# Patient Record
Sex: FEMALE | Race: WHITE
[De-identification: names, ages, dates, MRNs, and addresses within clinical notes are randomized per-mention and may not be internally consistent; named-entity substitution may affect disease eponyms.]

---

## 2020-04-24 ENCOUNTER — HOSPITAL ENCOUNTER (INPATIENT)
Dept: HOSPITAL 19 - LDRO | Age: 30
LOS: 1 days | Discharge: HOME | DRG: 998 | End: 2020-04-25
Attending: OBSTETRICS & GYNECOLOGY | Admitting: OBSTETRICS & GYNECOLOGY
Payer: COMMERCIAL

## 2020-04-24 VITALS — TEMPERATURE: 97.6 F | DIASTOLIC BLOOD PRESSURE: 72 MMHG | SYSTOLIC BLOOD PRESSURE: 118 MMHG | HEART RATE: 77 BPM

## 2020-04-24 VITALS — SYSTOLIC BLOOD PRESSURE: 124 MMHG | DIASTOLIC BLOOD PRESSURE: 78 MMHG | HEART RATE: 86 BPM

## 2020-04-24 VITALS — HEART RATE: 80 BPM | SYSTOLIC BLOOD PRESSURE: 120 MMHG | DIASTOLIC BLOOD PRESSURE: 74 MMHG

## 2020-04-24 VITALS — HEART RATE: 76 BPM | SYSTOLIC BLOOD PRESSURE: 114 MMHG | DIASTOLIC BLOOD PRESSURE: 73 MMHG

## 2020-04-24 VITALS — SYSTOLIC BLOOD PRESSURE: 108 MMHG | DIASTOLIC BLOOD PRESSURE: 62 MMHG | TEMPERATURE: 98.2 F | HEART RATE: 76 BPM

## 2020-04-24 VITALS — DIASTOLIC BLOOD PRESSURE: 75 MMHG | SYSTOLIC BLOOD PRESSURE: 112 MMHG | HEART RATE: 82 BPM

## 2020-04-24 VITALS — HEART RATE: 76 BPM | SYSTOLIC BLOOD PRESSURE: 108 MMHG | DIASTOLIC BLOOD PRESSURE: 72 MMHG

## 2020-04-24 VITALS — HEIGHT: 67.01 IN | WEIGHT: 180.34 LBS | BODY MASS INDEX: 28.3 KG/M2

## 2020-04-24 VITALS — SYSTOLIC BLOOD PRESSURE: 120 MMHG | HEART RATE: 88 BPM | DIASTOLIC BLOOD PRESSURE: 59 MMHG

## 2020-04-24 VITALS — HEART RATE: 80 BPM | DIASTOLIC BLOOD PRESSURE: 79 MMHG | TEMPERATURE: 98 F | SYSTOLIC BLOOD PRESSURE: 120 MMHG

## 2020-04-24 VITALS — TEMPERATURE: 98.2 F | HEART RATE: 86 BPM | SYSTOLIC BLOOD PRESSURE: 100 MMHG | DIASTOLIC BLOOD PRESSURE: 58 MMHG

## 2020-04-24 VITALS — HEART RATE: 99 BPM | DIASTOLIC BLOOD PRESSURE: 66 MMHG | SYSTOLIC BLOOD PRESSURE: 126 MMHG

## 2020-04-24 DIAGNOSIS — Z3A.40: ICD-10-CM

## 2020-04-24 DIAGNOSIS — D69.6: ICD-10-CM

## 2020-04-24 LAB
BASOPHILS # BLD: 0.1 10*3/UL (ref 0–0.2)
BASOPHILS NFR BLD AUTO: 0.7 % (ref 0–2)
EOSINOPHIL # BLD: 0.1 10*3/UL (ref 0–0.7)
EOSINOPHIL NFR BLD: 0.9 % (ref 0–4)
ERYTHROCYTE [DISTWIDTH] IN BLOOD BY AUTOMATED COUNT: 12.6 % (ref 11.5–14.5)
GRANULOCYTES # BLD AUTO: 60.8 % (ref 42.2–75.2)
HCT VFR BLD AUTO: 38.7 % (ref 37–47)
HGB BLD-MCNC: 13.5 G/DL (ref 12.5–16)
LYMPHOCYTES # BLD: 3.4 10*3/UL (ref 1.2–3.4)
LYMPHOCYTES NFR BLD: 29.5 % (ref 20–51)
MCH RBC QN AUTO: 31 PG (ref 27–31)
MCHC RBC AUTO-ENTMCNC: 35 G/DL (ref 33–37)
MCV RBC AUTO: 90 FL (ref 80–100)
MONOCYTES # BLD: 0.9 10*3/UL (ref 0.1–0.6)
MONOCYTES NFR BLD AUTO: 7.6 % (ref 1.7–9.3)
NEUTROPHILS # BLD: 7 10*3/UL (ref 1.4–6.5)
PLATELET # BLD AUTO: 118 K/MM3 (ref 130–400)
PMV BLD AUTO: 11 FL (ref 7.4–10.4)
RBC # BLD AUTO: 4.31 M/MM3 (ref 4.1–5.3)

## 2020-04-24 PROCEDURE — 0UQMXZZ REPAIR VULVA, EXTERNAL APPROACH: ICD-10-PCS | Performed by: OBSTETRICS & GYNECOLOGY

## 2020-04-24 PROCEDURE — 0HQ9XZZ REPAIR PERINEUM SKIN, EXTERNAL APPROACH: ICD-10-PCS | Performed by: OBSTETRICS & GYNECOLOGY

## 2020-04-24 NOTE — NUR
0245- PT PRESENTS TO LDR COMPLAINING OF CONTRACTIONS AND POSSIBLE LEAKING,
AMBULATORY TO ROOM LR4, CHANGED INTO GOWN.
0253- EFM X2 APPLIED. PT HAVING PINK TINGED LEAKING, HAS BEEN VARSHA FOR
LAST 1.5 HOURS, AND IS FEELING THE BABY MOVE. VSS.
0300- AMNITRACE IS NEGATIVE, SVE BY THIS NURSE 4/90/-1 WITH BULGING BAG OF
WATER. PLAN OF CARE DISCUSSED WITH PT. WILL RECHECK IN 30MIN.
0305- NURSING ADMISSION HISTORY AND PHYSICAL COMPLETED. PT BREATHING THROUGH
CONTRACTIONS.
0330- SVE BY THIS NURSE 4-5/90/-1 WITH BULGING BAG. PT WANTING AN EPIDURAL.
0332- DR PETERSEN CALLED AND UPDATED ON PT HISTORY, PRESENTING COMPLAINT, SVE,
STRIP ASSESSMENT. ORDERS FOR ADMISSION AND EPIDURAL.
0334- PT CALLS OUT.
0335- NURSE TO BEDSIDE. PT STATES HER WATER BROKE AND SHE FEELS LIKE PUSHING.
COPIUS AMOUNTS OF CLEAR FLUID LEAKING FROM PT'S VAGINA. SVE BY THIS NURSE,
COMPLETE AND +2.
0340- DR PETERSEN CALLED AND UPDATED PT HAS RUPTURED AND IS COMPLETE. SHE IS ON
THE WAY.
0345- DR PETERSEN AT BEDSIDE. IV START TO LEFT HAND. PT BEGINS PUSHING WITH
CONTRACTIONS.
0350- IV SITE TORN OUT BY PT DURING PUSHING.
0354- SPONTANEOUS VAGINAL DELIVERY OF VIABLE FEMALE, VIGOROUS, TO MOTHER'S
ABDOMEN AND CARE OF NURSERY STAFF.
0357- SPONTANEOUS DELIVERY OF PLACENTA, EXAMINED BY DR PETERSEN. REPAIR IN
PROGRESS.
0400- PITOCIN 10UNITS IM GIVEN AS ORDERED.
0412- DIFFICULT REPAIR IN PROGRESS, MORPHINE GIVEN IV FOR PAIN AS ORDERED.
0425- DIFFICULT REPAIR IN PROGRESS, MORPHINE GIVEN IV FOR PAIN AS ORDERED.
0500- REPAIR COMPLETE, PFEFIFER CATHETER IN PLACE. PERICARE PERFORMED, ICE PACK
TO PERINEUM. FEET DOWN FROM FOOTPLATES AND POSTPARTUM RECOVERY STARTED.
0530- ADMISSION CONSENTS SIGNED.

## 2020-04-25 VITALS — DIASTOLIC BLOOD PRESSURE: 61 MMHG | TEMPERATURE: 97.9 F | SYSTOLIC BLOOD PRESSURE: 111 MMHG | HEART RATE: 79 BPM

## 2020-04-25 LAB
BASOPHILS # BLD: 0.1 10*3/UL (ref 0–0.2)
BASOPHILS NFR BLD AUTO: 0.5 % (ref 0–2)
EOSINOPHIL # BLD: 0.1 10*3/UL (ref 0–0.7)
EOSINOPHIL NFR BLD: 1.2 % (ref 0–4)
ERYTHROCYTE [DISTWIDTH] IN BLOOD BY AUTOMATED COUNT: 13.1 % (ref 11.5–14.5)
GRANULOCYTES # BLD AUTO: 61.6 % (ref 42.2–75.2)
HCT VFR BLD AUTO: 28.9 % (ref 37–47)
HGB BLD-MCNC: 9.9 G/DL (ref 12.5–16)
LYMPHOCYTES # BLD: 3.1 10*3/UL (ref 1.2–3.4)
LYMPHOCYTES NFR BLD: 30.4 % (ref 20–51)
MCH RBC QN AUTO: 32 PG (ref 27–31)
MCHC RBC AUTO-ENTMCNC: 34 G/DL (ref 33–37)
MCV RBC AUTO: 94 FL (ref 80–100)
MONOCYTES # BLD: 0.6 10*3/UL (ref 0.1–0.6)
MONOCYTES NFR BLD AUTO: 5.8 % (ref 1.7–9.3)
NEUTROPHILS # BLD: 6.2 10*3/UL (ref 1.4–6.5)
PLATELET # BLD AUTO: 89 K/MM3 (ref 130–400)
PMV BLD AUTO: 11.1 FL (ref 7.4–10.4)
RBC # BLD AUTO: 3.07 M/MM3 (ref 4.1–5.3)

## 2022-08-05 ENCOUNTER — HOSPITAL ENCOUNTER (OUTPATIENT)
Dept: HOSPITAL 19 - LDRO | Age: 32
Discharge: HOME | End: 2022-08-05
Attending: OBSTETRICS & GYNECOLOGY
Payer: COMMERCIAL

## 2022-08-05 VITALS — HEART RATE: 80 BPM | DIASTOLIC BLOOD PRESSURE: 75 MMHG | SYSTOLIC BLOOD PRESSURE: 119 MMHG | TEMPERATURE: 98.7 F

## 2022-08-05 VITALS — HEIGHT: 67.99 IN | BODY MASS INDEX: 28.4 KG/M2 | WEIGHT: 187.39 LBS

## 2022-08-05 DIAGNOSIS — Z3A.40: ICD-10-CM

## 2022-08-05 DIAGNOSIS — O36.8190: Primary | ICD-10-CM

## 2022-08-05 NOTE — NUR
1800-PT ARRIVED TO L&D C/O DECREASED FM. PT DENIES LEAKING OF FLUID,
CONTRACTIONS, AND VAGINAL BLEEDING. PT PLACED IN ROOM 3 FOR FURTHER
EVALUATION.

## 2022-08-05 NOTE — NUR
1830- PT OFF MONITOR FOR DISMISSAL. SHE REPORTS THAT SHE HAS BEEN FEELING
FETAL MOVEMENT SINCE BEING HERE. SHE STILL DENIES CONTRACTIONS.
1845- DISMISSAL INSTRUCTIONS GIVEN AND PT VERBALIZES UNDERSTANDING. PAPERS
SIGNED.
1849- PT DISMISSED TO HOME AMBULATORY ACCOMPANIED BY SPOUSE.

## 2022-08-08 ENCOUNTER — HOSPITAL ENCOUNTER (INPATIENT)
Dept: HOSPITAL 19 - LDRO | Age: 32
LOS: 2 days | Discharge: HOME | End: 2022-08-10
Attending: OBSTETRICS & GYNECOLOGY | Admitting: OBSTETRICS & GYNECOLOGY
Payer: COMMERCIAL

## 2022-08-08 VITALS — HEART RATE: 73 BPM | SYSTOLIC BLOOD PRESSURE: 125 MMHG | DIASTOLIC BLOOD PRESSURE: 60 MMHG

## 2022-08-08 VITALS — SYSTOLIC BLOOD PRESSURE: 117 MMHG | HEART RATE: 78 BPM | DIASTOLIC BLOOD PRESSURE: 67 MMHG

## 2022-08-08 VITALS — SYSTOLIC BLOOD PRESSURE: 116 MMHG | TEMPERATURE: 98.6 F | HEART RATE: 83 BPM | DIASTOLIC BLOOD PRESSURE: 73 MMHG

## 2022-08-08 VITALS — BODY MASS INDEX: 27.76 KG/M2 | WEIGHT: 187.39 LBS | HEIGHT: 69.02 IN

## 2022-08-08 VITALS — SYSTOLIC BLOOD PRESSURE: 123 MMHG | DIASTOLIC BLOOD PRESSURE: 76 MMHG | HEART RATE: 71 BPM

## 2022-08-08 VITALS — DIASTOLIC BLOOD PRESSURE: 79 MMHG | HEART RATE: 57 BPM | SYSTOLIC BLOOD PRESSURE: 122 MMHG

## 2022-08-08 VITALS — HEART RATE: 101 BPM | DIASTOLIC BLOOD PRESSURE: 81 MMHG | SYSTOLIC BLOOD PRESSURE: 115 MMHG

## 2022-08-08 VITALS — HEART RATE: 66 BPM | SYSTOLIC BLOOD PRESSURE: 125 MMHG | DIASTOLIC BLOOD PRESSURE: 75 MMHG

## 2022-08-08 VITALS — TEMPERATURE: 97.5 F | SYSTOLIC BLOOD PRESSURE: 119 MMHG | HEART RATE: 81 BPM | DIASTOLIC BLOOD PRESSURE: 76 MMHG

## 2022-08-08 VITALS — SYSTOLIC BLOOD PRESSURE: 98 MMHG | DIASTOLIC BLOOD PRESSURE: 54 MMHG | HEART RATE: 92 BPM

## 2022-08-08 VITALS — HEART RATE: 78 BPM | DIASTOLIC BLOOD PRESSURE: 61 MMHG | SYSTOLIC BLOOD PRESSURE: 119 MMHG

## 2022-08-08 VITALS — DIASTOLIC BLOOD PRESSURE: 76 MMHG | HEART RATE: 77 BPM | SYSTOLIC BLOOD PRESSURE: 136 MMHG

## 2022-08-08 DIAGNOSIS — O48.0: Primary | ICD-10-CM

## 2022-08-08 DIAGNOSIS — D69.6: ICD-10-CM

## 2022-08-08 DIAGNOSIS — Z3A.41: ICD-10-CM

## 2022-08-08 LAB
BASOPHILS # BLD: 0.1 K/MM3 (ref 0–0.2)
BASOPHILS NFR BLD AUTO: 0.5 % (ref 0–2)
EOSINOPHIL # BLD: 0.1 K/MM3 (ref 0–0.7)
EOSINOPHIL NFR BLD: 0.8 % (ref 0–4)
ERYTHROCYTE [DISTWIDTH] IN BLOOD BY AUTOMATED COUNT: 13 % (ref 11.5–14.5)
GRANULOCYTES # BLD AUTO: 71.4 % (ref 42.2–75.2)
HCT VFR BLD AUTO: 38.8 % (ref 37–47)
HGB BLD-MCNC: 13.7 G/DL (ref 12.5–16)
LYMPHOCYTES # BLD: 2 K/MM3 (ref 1.2–3.4)
LYMPHOCYTES NFR BLD: 17.9 % (ref 20–51)
MCH RBC QN AUTO: 32 PG (ref 27–31)
MCHC RBC AUTO-ENTMCNC: 35 G/DL (ref 33–37)
MCV RBC AUTO: 91 FL (ref 80–100)
MONOCYTES # BLD: 1 K/MM3 (ref 0.1–0.6)
MONOCYTES NFR BLD AUTO: 8.7 % (ref 1.7–9.3)
NEUTROPHILS # BLD: 7.9 K/MM3 (ref 1.4–6.5)
PLATELET # BLD AUTO: 126 K/MM3 (ref 130–400)
PMV BLD AUTO: 10.9 FL (ref 7.4–10.4)
RBC # BLD AUTO: 4.26 M/MM3 (ref 4.1–5.3)

## 2022-08-08 PROCEDURE — 0HQ9XZZ REPAIR PERINEUM SKIN, EXTERNAL APPROACH: ICD-10-PCS | Performed by: OBSTETRICS & GYNECOLOGY

## 2022-08-08 NOTE — NUR
1940- SVE OF COMPLETE, BULGING BAG. MOTHER CURRENTLY IN HANDS AND KNEES.
1942- DR. CAMERON NOTIFIED OF IMPENDING DELIVERY, ON HIS WAY TO THE HOSPITAL.
1945- PT STATES URGE TO PUSH, VERY WELL CONTROLLED WITH CONTRACTIONS,
INSTRUCTED TO BREATH OR PANT WITH CONTRACTIONS IF POSSIBLE.
1955- DR. CAMERON IN ROOM FOR DELIVERY, PT TURNED TO [facility] FOR
DELIVERY.PT BEGINS PUSHING WITH DR. CAMERON.
2000- SPONTANEOUS VAGINAL DELIVERY OF VIABLE BABY BOY. DELAYED CORD CLAMPING
PERFORMED. CORD THEN CLAMPED BY DR. CAMERON AND CUT BY FOB. BABY TO MOTHER'S
CHEST. BABY CARES ASSUMED BY LILLY MENENDEZ RN.
2006- SPONTANEOUS DELIVERY OF INTACT PLACENTA. POSTPARTUM PITOCIN STARTED AT
333ML/HR PER PROTOCOL. DR. CAMERON REPAIRS 1ST DEGREE LACERATION, NO LOCAL
NECESSARY.
2015- POSTPARTUM RECOVERY STARTED.

## 2022-08-08 NOTE — NUR
pt up to bathroom, able to void 250 mls. Pericare provided, ice pack, peripad,
and mesh underwear applied. Pt able to void to postpartum without difficulty,
tolerated well. Oriented to postpartum room.

## 2022-08-08 NOTE — NUR
1600 - PATIENT AMBULATORY TO LDR4 ACCOMPANIED BY SPOUSE. PATIENT REPORTS
REGULAR CONTRACTIONS STARTING AT 1400. PATIENT REPORTS GOOD FETAL MOVEMENT AND
DENIES LEAKING OF FLUID. PATIENT ORIENTED TO ROOM.
 
1605 - PATIENT ON EFM.
 
1610 - SVE PERFORMED BY ZEN WOOD. 7/80/-1.
 
1615 - MD NICOLA CALLED AND NOTIFIED OF PATIENT ARRIVAL ON UNIT AND PRESENT
EXAM. MD NICOLA ORDERS TO ADMIT PATIENT AND STATES SHE WILL FOLLOW PATIENT
UNTIL 1700 WHEN ON CALL PROVIDER ASSUMES CARE.
 
1620 - IV PLACED AND LABS DRAWN AS ORDERED.
 
1640 - PATIENT OFF MONITOR FOR BRP AND AMBULATION.
 
CARE ONGOING.

## 2022-08-09 VITALS — SYSTOLIC BLOOD PRESSURE: 117 MMHG | DIASTOLIC BLOOD PRESSURE: 68 MMHG | TEMPERATURE: 97.9 F | HEART RATE: 79 BPM

## 2022-08-09 VITALS — TEMPERATURE: 97.7 F | HEART RATE: 83 BPM | SYSTOLIC BLOOD PRESSURE: 101 MMHG | DIASTOLIC BLOOD PRESSURE: 59 MMHG

## 2022-08-09 VITALS — DIASTOLIC BLOOD PRESSURE: 75 MMHG | TEMPERATURE: 97.9 F | HEART RATE: 82 BPM | SYSTOLIC BLOOD PRESSURE: 111 MMHG

## 2022-08-09 VITALS — HEART RATE: 86 BPM | SYSTOLIC BLOOD PRESSURE: 109 MMHG | DIASTOLIC BLOOD PRESSURE: 66 MMHG

## 2022-08-09 NOTE — NUR
Initial visit; Patient thanked  for offering congratulations and God's
blessings for the birth of her third child, a little boy.  thanked
Jacki for choosing [facility].

## 2022-08-10 VITALS — HEART RATE: 65 BPM | DIASTOLIC BLOOD PRESSURE: 59 MMHG | SYSTOLIC BLOOD PRESSURE: 99 MMHG | TEMPERATURE: 98.2 F

## 2024-05-31 ENCOUNTER — HOSPITAL ENCOUNTER (INPATIENT)
Dept: HOSPITAL 19 - LDRO | Age: 34
LOS: 1 days | Discharge: HOME | End: 2024-06-01
Attending: OBSTETRICS & GYNECOLOGY | Admitting: OBSTETRICS & GYNECOLOGY
Payer: COMMERCIAL

## 2024-05-31 DIAGNOSIS — O48.0: Primary | ICD-10-CM

## 2024-05-31 DIAGNOSIS — Z3A.40: ICD-10-CM

## 2024-05-31 DIAGNOSIS — D69.6: ICD-10-CM
